# Patient Record
Sex: MALE | Race: WHITE | NOT HISPANIC OR LATINO | Employment: FULL TIME | URBAN - METROPOLITAN AREA
[De-identification: names, ages, dates, MRNs, and addresses within clinical notes are randomized per-mention and may not be internally consistent; named-entity substitution may affect disease eponyms.]

---

## 2023-10-23 ENCOUNTER — HOSPITAL ENCOUNTER (EMERGENCY)
Facility: HOSPITAL | Age: 63
Discharge: HOME | End: 2023-10-23
Attending: EMERGENCY MEDICINE | Admitting: EMERGENCY MEDICINE
Payer: COMMERCIAL

## 2023-10-23 ENCOUNTER — APPOINTMENT (OUTPATIENT)
Dept: CT IMAGING | Facility: HOSPITAL | Age: 63
End: 2023-10-23
Payer: COMMERCIAL

## 2023-10-23 ENCOUNTER — APPOINTMENT (OUTPATIENT)
Dept: GENERAL RADIOLOGY | Facility: HOSPITAL | Age: 63
End: 2023-10-23
Payer: COMMERCIAL

## 2023-10-23 VITALS
DIASTOLIC BLOOD PRESSURE: 69 MMHG | HEIGHT: 69 IN | OXYGEN SATURATION: 98 % | BODY MASS INDEX: 30.36 KG/M2 | WEIGHT: 205 LBS | SYSTOLIC BLOOD PRESSURE: 121 MMHG | TEMPERATURE: 98.4 F | RESPIRATION RATE: 18 BRPM | HEART RATE: 57 BPM

## 2023-10-23 DIAGNOSIS — H81.399 PERIPHERAL VERTIGO, UNSPECIFIED LATERALITY: Primary | ICD-10-CM

## 2023-10-23 LAB
ABO GROUP BLD: NORMAL
ALBUMIN SERPL-MCNC: 4.4 G/DL (ref 3.5–5.2)
ALBUMIN/GLOB SERPL: 2 G/DL
ALP SERPL-CCNC: 49 U/L (ref 39–117)
ALT SERPL W P-5'-P-CCNC: 20 U/L (ref 1–41)
ANION GAP SERPL CALCULATED.3IONS-SCNC: 9.7 MMOL/L (ref 5–15)
AST SERPL-CCNC: 17 U/L (ref 1–40)
BASOPHILS # BLD AUTO: 0.03 10*3/MM3 (ref 0–0.2)
BASOPHILS NFR BLD AUTO: 0.3 % (ref 0–1.5)
BILIRUB SERPL-MCNC: 0.3 MG/DL (ref 0–1.2)
BLD GP AB SCN SERPL QL: NEGATIVE
BUN SERPL-MCNC: 17 MG/DL (ref 8–23)
BUN/CREAT SERPL: 16.8 (ref 7–25)
CALCIUM SPEC-SCNC: 9.2 MG/DL (ref 8.6–10.5)
CHLORIDE SERPL-SCNC: 104 MMOL/L (ref 98–107)
CO2 SERPL-SCNC: 24.3 MMOL/L (ref 22–29)
CREAT SERPL-MCNC: 1.01 MG/DL (ref 0.76–1.27)
DEPRECATED RDW RBC AUTO: 44.4 FL (ref 37–54)
EGFRCR SERPLBLD CKD-EPI 2021: 84.1 ML/MIN/1.73
EOSINOPHIL # BLD AUTO: 0.06 10*3/MM3 (ref 0–0.4)
EOSINOPHIL NFR BLD AUTO: 0.5 % (ref 0.3–6.2)
ERYTHROCYTE [DISTWIDTH] IN BLOOD BY AUTOMATED COUNT: 12.9 % (ref 12.3–15.4)
GLOBULIN UR ELPH-MCNC: 2.2 GM/DL
GLUCOSE BLDC GLUCOMTR-MCNC: 130 MG/DL (ref 70–130)
GLUCOSE SERPL-MCNC: 130 MG/DL (ref 65–99)
HCT VFR BLD AUTO: 40.9 % (ref 37.5–51)
HGB BLD-MCNC: 13.9 G/DL (ref 13–17.7)
HOLD SPECIMEN: NORMAL
HOLD SPECIMEN: NORMAL
IMM GRANULOCYTES # BLD AUTO: 0.03 10*3/MM3 (ref 0–0.05)
IMM GRANULOCYTES NFR BLD AUTO: 0.3 % (ref 0–0.5)
INR PPP: 0.97 (ref 0.9–1.1)
LYMPHOCYTES # BLD AUTO: 3.17 10*3/MM3 (ref 0.7–3.1)
LYMPHOCYTES NFR BLD AUTO: 27.2 % (ref 19.6–45.3)
MCH RBC QN AUTO: 31.8 PG (ref 26.6–33)
MCHC RBC AUTO-ENTMCNC: 34 G/DL (ref 31.5–35.7)
MCV RBC AUTO: 93.6 FL (ref 79–97)
MONOCYTES # BLD AUTO: 0.71 10*3/MM3 (ref 0.1–0.9)
MONOCYTES NFR BLD AUTO: 6.1 % (ref 5–12)
NEUTROPHILS NFR BLD AUTO: 65.6 % (ref 42.7–76)
NEUTROPHILS NFR BLD AUTO: 7.64 10*3/MM3 (ref 1.7–7)
NRBC BLD AUTO-RTO: 0 /100 WBC (ref 0–0.2)
PLATELET # BLD AUTO: 205 10*3/MM3 (ref 140–450)
PMV BLD AUTO: 9.1 FL (ref 6–12)
POTASSIUM SERPL-SCNC: 3.9 MMOL/L (ref 3.5–5.2)
PROT SERPL-MCNC: 6.6 G/DL (ref 6–8.5)
PROTHROMBIN TIME: 13 SECONDS (ref 11.7–14.2)
QT INTERVAL: 455 MS
QTC INTERVAL: 403 MS
RBC # BLD AUTO: 4.37 10*6/MM3 (ref 4.14–5.8)
RH BLD: NEGATIVE
SODIUM SERPL-SCNC: 138 MMOL/L (ref 136–145)
T&S EXPIRATION DATE: NORMAL
WBC NRBC COR # BLD: 11.64 10*3/MM3 (ref 3.4–10.8)
WHOLE BLOOD HOLD COAG: NORMAL
WHOLE BLOOD HOLD SPECIMEN: NORMAL

## 2023-10-23 PROCEDURE — 80053 COMPREHEN METABOLIC PANEL: CPT | Performed by: EMERGENCY MEDICINE

## 2023-10-23 PROCEDURE — 86900 BLOOD TYPING SEROLOGIC ABO: CPT | Performed by: EMERGENCY MEDICINE

## 2023-10-23 PROCEDURE — 36415 COLL VENOUS BLD VENIPUNCTURE: CPT

## 2023-10-23 PROCEDURE — 70496 CT ANGIOGRAPHY HEAD: CPT

## 2023-10-23 PROCEDURE — 86901 BLOOD TYPING SEROLOGIC RH(D): CPT | Performed by: EMERGENCY MEDICINE

## 2023-10-23 PROCEDURE — 71045 X-RAY EXAM CHEST 1 VIEW: CPT

## 2023-10-23 PROCEDURE — 25010000002 ONDANSETRON PER 1 MG: Performed by: EMERGENCY MEDICINE

## 2023-10-23 PROCEDURE — 93005 ELECTROCARDIOGRAM TRACING: CPT | Performed by: EMERGENCY MEDICINE

## 2023-10-23 PROCEDURE — 85610 PROTHROMBIN TIME: CPT | Performed by: EMERGENCY MEDICINE

## 2023-10-23 PROCEDURE — 99291 CRITICAL CARE FIRST HOUR: CPT | Performed by: PSYCHIATRY & NEUROLOGY

## 2023-10-23 PROCEDURE — 96374 THER/PROPH/DIAG INJ IV PUSH: CPT

## 2023-10-23 PROCEDURE — 70498 CT ANGIOGRAPHY NECK: CPT

## 2023-10-23 PROCEDURE — 86850 RBC ANTIBODY SCREEN: CPT | Performed by: EMERGENCY MEDICINE

## 2023-10-23 PROCEDURE — 25510000001 IOPAMIDOL PER 1 ML: Performed by: EMERGENCY MEDICINE

## 2023-10-23 PROCEDURE — 82948 REAGENT STRIP/BLOOD GLUCOSE: CPT

## 2023-10-23 PROCEDURE — 85025 COMPLETE CBC W/AUTO DIFF WBC: CPT | Performed by: EMERGENCY MEDICINE

## 2023-10-23 PROCEDURE — 99285 EMERGENCY DEPT VISIT HI MDM: CPT

## 2023-10-23 RX ORDER — BUPROPION HYDROCHLORIDE 100 MG/1
200 TABLET, EXTENDED RELEASE ORAL DAILY
COMMUNITY

## 2023-10-23 RX ORDER — MECLIZINE HYDROCHLORIDE 25 MG/1
25 TABLET ORAL ONCE
Status: COMPLETED | OUTPATIENT
Start: 2023-10-23 | End: 2023-10-23

## 2023-10-23 RX ORDER — CARVEDILOL 12.5 MG/1
6.25 TABLET ORAL 2 TIMES DAILY WITH MEALS
COMMUNITY

## 2023-10-23 RX ORDER — DIAZEPAM 2 MG/1
2 TABLET ORAL EVERY 8 HOURS PRN
COMMUNITY

## 2023-10-23 RX ORDER — ONDANSETRON 2 MG/ML
4 INJECTION INTRAMUSCULAR; INTRAVENOUS ONCE
Status: COMPLETED | OUTPATIENT
Start: 2023-10-23 | End: 2023-10-23

## 2023-10-23 RX ORDER — MECLIZINE HYDROCHLORIDE 25 MG/1
25 TABLET ORAL 3 TIMES DAILY PRN
Qty: 15 TABLET | Refills: 0 | Status: SHIPPED | OUTPATIENT
Start: 2023-10-23

## 2023-10-23 RX ORDER — FLECAINIDE ACETATE 50 MG/1
50 TABLET ORAL 2 TIMES DAILY
COMMUNITY

## 2023-10-23 RX ORDER — PREDNISONE 10 MG/1
10 TABLET ORAL DAILY
COMMUNITY

## 2023-10-23 RX ORDER — MONTELUKAST SODIUM 10 MG/1
10 TABLET ORAL NIGHTLY
COMMUNITY

## 2023-10-23 RX ORDER — SCOLOPAMINE TRANSDERMAL SYSTEM 1 MG/1
1 PATCH, EXTENDED RELEASE TRANSDERMAL
Status: DISCONTINUED | OUTPATIENT
Start: 2023-10-23 | End: 2023-10-23 | Stop reason: HOSPADM

## 2023-10-23 RX ORDER — ACETAMINOPHEN 500 MG
1000 TABLET ORAL EVERY 6 HOURS PRN
COMMUNITY

## 2023-10-23 RX ORDER — SODIUM CHLORIDE 0.9 % (FLUSH) 0.9 %
10 SYRINGE (ML) INJECTION AS NEEDED
Status: DISCONTINUED | OUTPATIENT
Start: 2023-10-23 | End: 2023-10-23 | Stop reason: HOSPADM

## 2023-10-23 RX ORDER — LIDOCAINE 50 MG/G
1 PATCH TOPICAL EVERY 24 HOURS
COMMUNITY

## 2023-10-23 RX ORDER — ONDANSETRON 4 MG/1
4 TABLET, ORALLY DISINTEGRATING ORAL EVERY 8 HOURS PRN
Qty: 12 TABLET | Refills: 0 | Status: SHIPPED | OUTPATIENT
Start: 2023-10-23

## 2023-10-23 RX ADMIN — ONDANSETRON 4 MG: 2 INJECTION INTRAMUSCULAR; INTRAVENOUS at 11:10

## 2023-10-23 RX ADMIN — SCOPALAMINE 1 PATCH: 1 PATCH, EXTENDED RELEASE TRANSDERMAL at 10:15

## 2023-10-23 RX ADMIN — IOPAMIDOL 95 ML: 755 INJECTION, SOLUTION INTRAVENOUS at 10:46

## 2023-10-23 RX ADMIN — MECLIZINE HYDROCHLORIDE 25 MG: 25 TABLET ORAL at 11:10

## 2023-10-23 NOTE — ED NOTES
"Dizzy and sweating.  He vomiting this am.  He is on prednisone r/t to a \"loss of hearing event\" - he took his prednisone on an empty stomach  " Problem: PAIN - ADULT  Goal: Verbalizes/displays adequate comfort level or baseline comfort level  Description: Interventions:  - Encourage patient to monitor pain and request assistance  - Assess pain using appropriate pain scale  - Administer analgesics based on type and severity of pain and evaluate response  - Implement non-pharmacological measures as appropriate and evaluate response  - Consider cultural and social influences on pain and pain management  - Notify physician/advanced practitioner if interventions unsuccessful or patient reports new pain  Outcome: Progressing

## 2023-10-23 NOTE — DISCHARGE INSTRUCTIONS
You are advised to follow closely with Dr. Parkinson in 2 to 3 days and Dr. Fletcher in 1 to 2 weeks as needed or recheck, final results of lab work and imaging testing, and further testing/treatment as needed.    Hydrate well.  Avoid air travel while this having symptoms.  Do not drive or operate machinery while having episodes of dizziness.      Please return to the emergency department immediately with chest pain different than usual for you, shortness of air, abdominal pain, unremittent dizziness, not improved with remaining still, persistent vomiting/fever, blood in emesis or stool, lightheadedness/fainting, problems with speech, one sided weakness/numbness, altered mental status, new incontinence, problems with vision,  or for worsening of symptoms or other concerns.

## 2023-10-23 NOTE — ED PROVIDER NOTES
EMERGENCY DEPARTMENT ENCOUNTER    CHIEF COMPLAINT  Chief Complaint: Dizziness  History given by: Patient  History limited by: Nothing  Room Number: 12/12  PMD: Provider, No Known  Dr. Parkinson in [facility]  ENT; Dr. Vazquez in [facility]    HPI:  Pt is a 63 y.o. male presents complaining of onset of dizziness at approximately 8:30 AM when he stood up from a meal.  Patient reports he felt that the room was moving, had sweating, nausea and is vomited approximately 10 times since then.  Patient denies visual disturbance, speech disturbance, unilateral weakness or numbness.  Patient reports his dizziness is improved but is not resolved.  Patient reports at rest he has some mild sense of vague movement which is worse with movement of his head while stationary.  Patient reports persistent nausea, denies chest pain, shortness of air, palpitations, abdominal pain, swelling of extremities.  Patient reports he has a history of A-fib status post cardioversion x2, takes flecainide as directed.  Patient reports he is not on blood thinners.  Patient denies history of bleeding, reports he has 1 kidney but that his kidney function is normal.  Patient denies history of coronary artery disease, pacemaker.      Aggravating Factors: Movement of his head  Alleviating Factors: Remaining still  Treatment before arrival: Nothing  Chronic or social conditions impacting care: History of A-fib    Additional sources:  Discussed/ obtained information from independent historians: Family at bedside    External (non-ED) record review:     None seen      PAST MEDICAL HISTORY  Active Ambulatory Problems     Diagnosis Date Noted    No Active Ambulatory Problems     Resolved Ambulatory Problems     Diagnosis Date Noted    No Resolved Ambulatory Problems     Past Medical History:   Diagnosis Date    A-fib     Anxiety     Depression     Hypertension     Seasonal allergies        PAST SURGICAL HISTORY  History reviewed. No pertinent surgical  history.    FAMILY HISTORY  History reviewed. No pertinent family history.    SOCIAL HISTORY  Social History     Socioeconomic History    Marital status:        ALLERGIES  Patient has no known allergies.    REVIEW OF SYSTEMS  Review of Systems    PHYSICAL EXAM  ED Triage Vitals [10/23/23 0842]   Temp Heart Rate Resp BP SpO2   98.4 °F (36.9 °C) 56 16 -- 98 %      Temp src Heart Rate Source Patient Position BP Location FiO2 (%)   Tympanic Monitor -- -- --       Physical Exam  Vitals and nursing note reviewed.   Constitutional:       General: He is in acute distress.   HENT:      Head: Normocephalic and atraumatic.   Cardiovascular:      Rate and Rhythm: Regular rhythm. Bradycardia present.      Pulses:           Posterior tibial pulses are 2+ on the right side and 2+ on the left side.      Heart sounds: Normal heart sounds. No murmur heard.  Pulmonary:      Effort: Pulmonary effort is normal. No respiratory distress.      Breath sounds: Normal breath sounds. No wheezing.   Abdominal:      General: Bowel sounds are normal.      Palpations: Abdomen is soft.      Tenderness: There is no abdominal tenderness. There is no guarding or rebound.   Musculoskeletal:         General: Normal range of motion.      Cervical back: Normal range of motion.   Skin:     General: Skin is warm and dry.   Neurological:      Mental Status: He is alert and oriented to person, place, and time.   Psychiatric:         Mood and Affect: Affect normal.         LAB RESULTS  Recent Results (from the past 24 hour(s))   Green Top (Gel)    Collection Time: 10/23/23  8:50 AM   Result Value Ref Range    Extra Tube Hold for add-ons.    Lavender Top    Collection Time: 10/23/23  8:50 AM   Result Value Ref Range    Extra Tube hold for add-on    Gray Top    Collection Time: 10/23/23  8:50 AM   Result Value Ref Range    Extra Tube Hold for add-ons.    Light Blue Top    Collection Time: 10/23/23  8:50 AM   Result Value Ref Range    Extra Tube Hold for  add-ons.    Comprehensive Metabolic Panel    Collection Time: 10/23/23  8:50 AM    Specimen: Blood   Result Value Ref Range    Glucose 130 (H) 65 - 99 mg/dL    BUN 17 8 - 23 mg/dL    Creatinine 1.01 0.76 - 1.27 mg/dL    Sodium 138 136 - 145 mmol/L    Potassium 3.9 3.5 - 5.2 mmol/L    Chloride 104 98 - 107 mmol/L    CO2 24.3 22.0 - 29.0 mmol/L    Calcium 9.2 8.6 - 10.5 mg/dL    Total Protein 6.6 6.0 - 8.5 g/dL    Albumin 4.4 3.5 - 5.2 g/dL    ALT (SGPT) 20 1 - 41 U/L    AST (SGOT) 17 1 - 40 U/L    Alkaline Phosphatase 49 39 - 117 U/L    Total Bilirubin 0.3 0.0 - 1.2 mg/dL    Globulin 2.2 gm/dL    A/G Ratio 2.0 g/dL    BUN/Creatinine Ratio 16.8 7.0 - 25.0    Anion Gap 9.7 5.0 - 15.0 mmol/L    eGFR 84.1 >60.0 mL/min/1.73   Protime-INR    Collection Time: 10/23/23  8:50 AM    Specimen: Blood   Result Value Ref Range    Protime 13.0 11.7 - 14.2 Seconds    INR 0.97 0.90 - 1.10   CBC Auto Differential    Collection Time: 10/23/23  8:50 AM    Specimen: Blood   Result Value Ref Range    WBC 11.64 (H) 3.40 - 10.80 10*3/mm3    RBC 4.37 4.14 - 5.80 10*6/mm3    Hemoglobin 13.9 13.0 - 17.7 g/dL    Hematocrit 40.9 37.5 - 51.0 %    MCV 93.6 79.0 - 97.0 fL    MCH 31.8 26.6 - 33.0 pg    MCHC 34.0 31.5 - 35.7 g/dL    RDW 12.9 12.3 - 15.4 %    RDW-SD 44.4 37.0 - 54.0 fl    MPV 9.1 6.0 - 12.0 fL    Platelets 205 140 - 450 10*3/mm3    Neutrophil % 65.6 42.7 - 76.0 %    Lymphocyte % 27.2 19.6 - 45.3 %    Monocyte % 6.1 5.0 - 12.0 %    Eosinophil % 0.5 0.3 - 6.2 %    Basophil % 0.3 0.0 - 1.5 %    Immature Grans % 0.3 0.0 - 0.5 %    Neutrophils, Absolute 7.64 (H) 1.70 - 7.00 10*3/mm3    Lymphocytes, Absolute 3.17 (H) 0.70 - 3.10 10*3/mm3    Monocytes, Absolute 0.71 0.10 - 0.90 10*3/mm3    Eosinophils, Absolute 0.06 0.00 - 0.40 10*3/mm3    Basophils, Absolute 0.03 0.00 - 0.20 10*3/mm3    Immature Grans, Absolute 0.03 0.00 - 0.05 10*3/mm3    nRBC 0.0 0.0 - 0.2 /100 WBC   POC Glucose Once    Collection Time: 10/23/23 10:10 AM    Specimen:  Blood   Result Value Ref Range    Glucose 130 70 - 130 mg/dL   Type & Screen    Collection Time: 10/23/23 10:13 AM    Specimen: Blood   Result Value Ref Range    ABO Type A     RH type Negative     Antibody Screen Negative     T&S Expiration Date 10/26/2023 11:59:59 PM    ECG 12 Lead ED Triage Standing Order; Stroke (Onset >12 hrs)    Collection Time: 10/23/23 10:14 AM   Result Value Ref Range    QT Interval 455 ms    QTC Interval 403 ms       I ordered the above labs and reviewed the results    RADIOLOGY  CT Angiogram Head w AI Analysis of LVO, CT Angiogram Neck    Result Date: 10/23/2023  EMERGENCY CONTRAST ENHANCED CT ANGIOGRAM OF THE HEAD AND NECK ON 10/23/2023  CLINICAL HISTORY: Evaluate for acute stroke, patient has dizziness and weakness.  TECHNIQUE: Spiral CT images were obtained from the base of the skull to the vertex both pre-intravenous and postintravenous contrast and these images were reformatted and submitted in 3 mm thick axial, sagittal and coronal CT sections with brain algorithm. Additional spiral CT angiogram images were obtained from the top of the aortic arch up through the great vessels of the head and neck during the arterial phase of contrast and these images were reformatted and submitted in 1 mm thick axial, sagittal and coronal CT sections with soft tissue algorithm and 3D reconstructions were performed to complete the CT angiogram of the head and neck  There are no prior studies from [facility] for comparison.  FINDINGS: HEAD CT: On axial postcontrast CT images 44 through 46 there is subtle area of low density in the posterior superior medial left frontal cortex that measures about 7 x 3 mm in medial lateral and anterior posterior dimension.  It is nonspecific, it is probably artifact, could potentially be a small acute to subacute infarct. but given the fact that it is not seen on the precontrast images I favor it is artifact. The remainder of the brain parenchyma is  normal in attenuation. The ventricles are normal in size. I see no focal mass effect and no midline shift.  No extra-axial fluid collections were identified and there is no evidence of acute intracranial hemorrhage. No abnormal areas of enhancement are seen in the head. The calvarium and the skull base are normal in appearance. There is a 2.3 x 1.5 cm mucous retention cyst in the inferior right maxillary sinus. The remainder of the paranasal sinuses, mastoid air cells and middle ear cavities are clear.  CT ANGIOGRAM OF THE NECK:  The nasopharynx, oropharynx, the hypopharynx, the true cords and subglottic airway are normal in appearance.  The thyroid gland is unremarkable. The lung apices are clear. The parotid, , parapharyngeal and submandibular spaces are symmetric and normal in appearance. The patient has had extensive prior cervical spine surgery, anterior cervical discectomy fusion from C4 to C7 and additional posterior fusion from C4 to C7. There is beam hardening artifact off the metal in the hardware that streaks through the canal and foramina and limits evaluation. There is anatomic origin of the great vessels off the aortic arch. The left subclavian artery origin is normal in appearance and no stenosis is seen in the left subclavian artery. The left vertebral artery origin is normal in appearance. No stenosis is seen in the left vertebral artery from its origin to the vertebrobasilar junction. The left common carotid origin is normal in appearance.  No stenosis is seen in the left common carotid artery and its bifurcation into the left internal and external carotid arteries is normal in appearance.  No stenosis is seen in the cervical segment of the left internal carotid artery using the NASCET criteria. The brachiocephalic artery origin is normal in appearance.  No stenosis is seen in the brachiocephalic artery and its bifurcation into the right subclavian and common carotid arteries is normal in  appearance. There is calcified plaque that mild to moderately narrows the right subclavian origin.  Beyond its origin the right subclavian artery is widely patent without stenosis. The right vertebral artery origin is normal in appearance.  No stenosis is seen in the right vertebral artery from its origin to the vertebrobasilar junction. The right common carotid origin is normal in appearance.  No stenosis is seen in the right common carotid artery.  Mixed calcified and noncalcified plaque involving the posterior wall of the right common carotid bifurcation extends into the origin of the right internal carotid artery, but there is no stenosis in the cervical segment of the right internal carotid artery using the NASCET criteria.  CT ANGIOGRAM OF THE HEAD: The intracranial segments of the distal vertebral arteries are widely patent without stenosis to the vertebrobasilar junction. The basilar artery and the basilar tip is normal in appearance. The posterior cerebral and superior cerebellar arteries are within normal limits. The upper cervical, petrous, cavernous and supracavernous segments of the internal carotid arteries are normal in appearance. The A1 segments of the anterior cerebral arteries and the anterior communicating artery origin and A2 segments of the anterior cerebral arteries are within normal limits. The M1 segments of the middle cerebral arteries and the middle cerebral artery bifurcations are within normal limits.      1. Head CT demonstrates no convincing acute intracranial abnormality with no convincing acute infarct or intracranial hemorrhage seen.  The 7 x 3 mm focal area of low density in the posterior superior left frontal cortex seen on only the postcontrast image is probably artifact. The etiology of the patient's acute dizziness is not established on this exam.  If there remains any clinical suspicion of an acute stroke, I recommend an MRI of the brain for a more complete assessment. 2. The  patient has had extensive prior cervical spine surgery with anterior posterior fusion from C4 to C7 with anterior plate and screw fixation as well as posterior rods and posterior element screw fixation from C4 to C7 and streak artifact off the rods and plates limits evaluation of the cervical spinal canal and foramina. 3. Calcified plaque results in at least mild up to mild-to-moderate narrowing of the right subclavian artery origin. The remainder of the CT angiogram of the head and neck is normal with no stenosis seen in the vertebral arteries and no stenosis identified in the cervical segments of the internal carotid arteries using the NASCET criteria and no intracranial vascular stenoses or occlusions are identified.  Radiation dose reduction techniques were utilized, including automated exposure control and exposure modulation based on body size.   This report was finalized on 10/23/2023 1:44 PM by Dr. Clinton López M.D on Workstation: [id number]      XR Chest 1 View    Result Date: 10/23/2023  EXAM: XR CHEST 1 VW-  INDICATION: Stroke  COMPARISON: None available       No focal consolidation. No pleural effusion or pneumothorax.  Normal size cardiomediastinal silhouette. Partially visualized cervical spine fusion. Threaded screw partially overlying the right glenoid.    This report was finalized on 10/23/2023 10:43 AM by Dr. Cade Feliciano M.D on Workstation: [id number]       I ordered the above noted radiological studies. Interpreted by radiologist. Viewed and interpreted by me in PACS -no large pneumothorax      PROCEDURES  Procedures      MEDICATIONS GIVEN IN THE EMERGENCY DEPARTMENT  Medications   iopamidol (ISOVUE-370) 76 % injection 100 mL (95 mL Intravenous Given by Other 10/23/23 1046)   meclizine (ANTIVERT) tablet 25 mg (25 mg Oral Given 10/23/23 1110)   ondansetron (ZOFRAN) injection 4 mg (4 mg Intravenous Given 10/23/23 1110)           MEDICAL DECISION MAKING  Results were reviewed/discussed with the  patient and they were also made aware of online access. Pt also made aware that some labs, such as cultures, will not be resulted during ER visit and followup with PMD is necessary.   EKGs and labs independently viewed and interpreted by me.  Discussion below represents my analysis of pertinent findings related to patient's condition, differential diagnosis, treatment plan and final disposition.    Differential diagnosis Differential diagnosis for dizziness includes but is not limited to vertigo, presyncope, Ménière's disease, vestibular neuritis, toxic vestibulopathy, Warnicke's encephalopathy, multiple sclerosis, head trauma, stroke, orthostatic hypotension, hypoglycemia, vestibular migraine, panic disorder      Independent interpretation of labs, radiology studies, and discussions with consultants:  ED Course as of 10/23/23 1636   Mon Oct 23, 2023   0950 Discussed with Dr. Ratliff, on-call for stroke who is aware of patient's presentation, imaging, labs and he agrees that patient is not a tPA candidate based on his symptoms, however does recommend CT CTAs of head and neck with MRI in follow-up [TO]   1101 Jake with Dr. Castaneda, on-call for stroke who is aware of patient's presentation, has reviewed imaging, seen and examined the patient, feels patient's symptoms are secondary to peripheral vertigo triggered by steroids, reports imaging unremarkable, feels that patient can go home with symptomatic treatment, recommends discontinuing steroids, patient is discourage from flying as this may most assuredly exacerbate his symptoms. [TO]   1156 Discussed with Dr. Shan Campbell, who has reviewed and interpreted  CT CT angios of head and neck, reports it is a benign, unremarkable study. [TO]   1230 RN reports patient ambulates well and independently without difficulty. [TO]      ED Course User Index  [TO] Yvette Hall MD     EKG          EKG time: 1014  Rhythm/Rate: Sinus bradycardia, rate in the 40s  P waves and IA: Normal P waves,  normal MARIA R's  QRS, axis: Poor R wave progression  ST and T waves: Nonspecific ST/T wave findings inferior leads    Interpreted Contemporaneously by me, independently viewed  No old for comparison      Shared decision making: Patient and family voiced understanding of recommendation for hydration, meclizine, Zofran, avoid flying, return to the emergency department immediately with unilateral weakness numbness, visual disturbance, speech disturbance or intractable dizziness or persistent vomiting uncontrolled with antinausea medicine at home      MDM     Amount and/or Complexity of Data Reviewed  Clinical lab tests: reviewed  Tests in the radiology section of CPT®: reviewed  Tests in the medicine section of CPT®: reviewed           DIAGNOSIS  Final diagnoses:   Peripheral vertigo, unspecified laterality       DISPOSITION  DISCHARGE    Patient discharged in stable condition.    Reviewed implications of results, diagnosis, meds, responsibility to follow up, warning signs and symptoms of possible worsening, potential complications and reasons to return to ER,     Patient/Family voiced understanding of above instructions.    Discussed plan for discharge, as there is no emergent indication for admission. Patient referred to primary care provider for BP management due to today's BP. Pt/family is agreeable and understands need for follow up and repeat testing.  Pt is aware that discharge does not mean that nothing is wrong but it indicates no emergency is present that requires admission and they must continue care with follow-up as given below or physician of their choice.     FOLLOW-UP  Dr. Parkinson in [facility]    Schedule an appointment as soon as possible for a visit in 3 days  EVEN IF WELL    Dr. Fletcher in [facility], your otolaryngologist    In 1 week           Medication List        New Prescriptions      meclizine 25 MG tablet  Commonly known as: ANTIVERT  Take 1 tablet by mouth 3 (Three) Times a Day As Needed  for Dizziness. Do not drive or operate machinery     ondansetron ODT 4 MG disintegrating tablet  Commonly known as: ZOFRAN-ODT  Place 1 tablet on the tongue Every 8 (Eight) Hours As Needed for Vomiting.               Where to Get Your Medications        These medications were sent to [facility] [place] [id number]      Hours: Monday to Friday 7 AM to 6 PM, Saturday & Sunday 8 AM to 4:30 PM (Closed 12 PM to 12:30 PM) Phone: [phone]   meclizine 25 MG tablet  ondansetron ODT 4 MG disintegrating tablet           Latest Documented Vital Signs:  As of 16:36 EDT  BP- 121/69 HR- 57 Temp- 98.4 °F (36.9 °C) (Tympanic) O2 sat- 98%    --      Please note that portions of this note were completed with a voice recognition program.       Note Disclaimer: At [facility], we believe that sharing information builds trust and better relationships. You are receiving this note because you are receiving care at [facility] or recently visited. It is possible you will see health information before a provider has talked with you about it. This kind of information can be easy to misunderstand. To help you fully understand what it means for your health, we urge you to discuss this note with your provider.     Yvette Hall MD  10/23/23 5242

## 2023-10-23 NOTE — CONSULTS
NEUROLOGY CONSULT - STROKE TEAM    DOS: 10/23/2023  NAME: Param Hu   : 1960  PCP: Provider, No Known  CC: Stroke  Referring MD: No ref. provider found  Patient being seen at request of Dr. Hall for advice and opinion on vertigo.    Neurological Problem and Interval History:  63 y.o. male presents with chief complaint of: vertigo. Patient is visiting from [place]. Had an ordinary day yesterday without any injury or prolonged head position change. This morning he awoke in his usual health, showered, dressed went to breakfast and when he stood up, he had an acute onset of vertigo. Patient was told he had a 30% drop in his hearing and 6 days ago was started on 60mg of prednisone and is to go down to 50mg today. However, today was the first time he took his medicine on an empty stomach. He denies tinnitus, denies a change in his hearing, no focal findings. Patient has a history of borderline high HbA1C.      Past Medical/Surgical Hx:  Past Medical History:   Diagnosis Date    A-fib     Anxiety     Depression     Hypertension     Seasonal allergies      History reviewed. No pertinent surgical history.    Review of Systems:        No fever, sweats or chills,  No neck pain, back pain or joint pain  No loss of hearing or tinnitus  No blurry or double vision  No rashes or edema  No chest pain or palpitations  No shortness of breath or wheezing  No diarrhea or constipation  No urinary incontinence or dysuria  No seizures or syncope  No depression or anxiety    Medications On Admission  (Not in a hospital admission)      Allergies:  No Known Allergies    Social Hx:  Social History     Socioeconomic History    Marital status:        Family Hx:  History reviewed. No pertinent family history.    Review of Imaging (Interpretation of images not reports):      Laboratory Results:   Lab Results   Component Value Date    GLUCOSE 130 (H) 10/23/2023    CALCIUM 9.2 10/23/2023     10/23/2023    K 3.9 10/23/2023    CO2  "24.3 10/23/2023     10/23/2023    BUN 17 10/23/2023    CREATININE 1.01 10/23/2023    EGFRIFAFRI >60 12/27/2021    BCR 16.8 10/23/2023    ANIONGAP 9.7 10/23/2023     Lab Results   Component Value Date    WBC 5.62 09/19/2023    HGB 14.7 09/19/2023    HCT 42.9 09/19/2023    MCV 92.5 09/19/2023     09/19/2023     No results found for: \"CHOL\"  Lab Results   Component Value Date    HDL 44 07/03/2019     Lab Results   Component Value Date    LDL 72 07/03/2019     Lab Results   Component Value Date    TRIG 59 07/03/2019     No results found for: \"HGBA1C\"  No results found for: \"INR\", \"PROTIME\"      Physical Examination:   /82   Pulse 58   Temp 98.4 °F (36.9 °C) (Tympanic)   Resp 16   Ht 175.3 cm (69\")   Wt 93 kg (205 lb)   SpO2 100%   BMI 30.27 kg/m²   General Appearance:   Well developed, well nourished, well groomed, alert, and cooperative.  HEENT: Normocephalic. Atraumatic. No JVD, no tracheal deviation.  Neck and Spine: Normal range of motion.  Normal alignment. No mass or tenderness.   Extremities:    No edema or deformities.   Skin:    No rashes or discoloration    Neurological examination:  Higher Integrative  Function: Oriented to time, place and person. Normal registration, recall, attention span and concentration. Normal language including comprehension, spontaneous speech, repetition, naming and vocabulary. No neglect. Normal fund of knowledge and higher integrative function.  CN II: Pupils are equal, round, and reactive to light. Blinks to visual threat and counts fingers in all fields  CN III IV VI: Extraocular movements are full without nystagmus.   CN V: Normal facial sensation   CN VII: Facial movements are symmetric. No labial dysarthria  CN VIII:   Auditory acuity is normal.  CN IX & X:   No palatal or pharnygeal dysarthria.  CN XI: Turns head without abnormality.   CN XII:   The tongue is midline.  No lingual dysarthria.   Motor: Normal muscle strength, bulk and tone in upper and " lower extremities.  No fasciculations, rigidity, spasticity, or abnormal movements.  Reflexes: 1+ in the upper and lower extremities. Plantar responses are flexor.  Sensation: Normal to light touch, temperature, and proprioception in arms and legs.   Station and Gait: Deferred for bed rest    Coordination: Finger-to-nose test shows no dysmetria.  Rapid alternating movements are normal.  Heel-to-shin normal.    NIHSS:     Diagnoses / Discussion: 1) Vertigo -  63 y.o. who presents with Sx of acute onset of vertigo that is already resolving with a light dose of scopolamine. Patient had taken his prednisone on an empty stomach for the first time today. CT/CTA are negative to my read including for a CP-angle tumor. Non-focal exam and rapid resolution of his symptoms with scopolamine.     Plan:  Okay for discharge if final CT/CTA read agrees no critical lesion and patient is able to ambulate.  Would recommend that patient not fly for two weeks and to take his prednisone with meals     I have discussed the above with the patient and family.  Time spent with patient: 60min    MDM  Reviewed: previous chart, nursing note and vitals  Reviewed previous: CT scan and labs  Interpretation: labs and CT scan  Total time providing critical care: 30-74 minutes. This excludes time spent performing separately reportable procedures and services.         Esteban York MD  Neurology